# Patient Record
Sex: FEMALE | Race: WHITE | Employment: UNEMPLOYED
[De-identification: names, ages, dates, MRNs, and addresses within clinical notes are randomized per-mention and may not be internally consistent; named-entity substitution may affect disease eponyms.]

---

## 2023-05-08 ENCOUNTER — HOSPITAL ENCOUNTER (EMERGENCY)
Facility: HOSPITAL | Age: 63
Discharge: HOME | End: 2023-05-09
Attending: EMERGENCY MEDICINE
Payer: COMMERCIAL

## 2023-05-08 ENCOUNTER — APPOINTMENT (OUTPATIENT)
Facility: HOSPITAL | Age: 63
End: 2023-05-08
Payer: COMMERCIAL

## 2023-05-08 DIAGNOSIS — S93.05XA DISLOCATION, ANKLE, LEFT, INITIAL ENCOUNTER: ICD-10-CM

## 2023-05-08 DIAGNOSIS — S82.402A CLOSED FRACTURE OF LEFT TIBIA AND FIBULA, INITIAL ENCOUNTER: Primary | ICD-10-CM

## 2023-05-08 DIAGNOSIS — S82.202A CLOSED FRACTURE OF LEFT TIBIA AND FIBULA, INITIAL ENCOUNTER: Primary | ICD-10-CM

## 2023-05-08 DIAGNOSIS — S82.55XA CLOSED NONDISPLACED FRACTURE OF MEDIAL MALLEOLUS OF LEFT TIBIA, INITIAL ENCOUNTER: ICD-10-CM

## 2023-05-08 PROCEDURE — 99284 EMERGENCY DEPT VISIT MOD MDM: CPT

## 2023-05-08 PROCEDURE — 2500000003 HC RX 250 WO HCPCS: Performed by: NURSE PRACTITIONER

## 2023-05-08 PROCEDURE — 73600 X-RAY EXAM OF ANKLE: CPT

## 2023-05-08 PROCEDURE — 73610 X-RAY EXAM OF ANKLE: CPT

## 2023-05-08 PROCEDURE — 27810 TREATMENT OF ANKLE FRACTURE: CPT

## 2023-05-08 PROCEDURE — 96374 THER/PROPH/DIAG INJ IV PUSH: CPT

## 2023-05-08 PROCEDURE — 6370000000 HC RX 637 (ALT 250 FOR IP): Performed by: NURSE PRACTITIONER

## 2023-05-08 RX ORDER — OXYCODONE HYDROCHLORIDE AND ACETAMINOPHEN 5; 325 MG/1; MG/1
1 TABLET ORAL
Status: COMPLETED | OUTPATIENT
Start: 2023-05-08 | End: 2023-05-08

## 2023-05-08 RX ORDER — HYDROMORPHONE HYDROCHLORIDE 1 MG/ML
1 INJECTION, SOLUTION INTRAMUSCULAR; INTRAVENOUS; SUBCUTANEOUS
Status: COMPLETED | OUTPATIENT
Start: 2023-05-08 | End: 2023-05-08

## 2023-05-08 RX ADMIN — HYDROMORPHONE HYDROCHLORIDE 1 MG: 1 INJECTION, SOLUTION INTRAMUSCULAR; INTRAVENOUS; SUBCUTANEOUS at 23:27

## 2023-05-08 RX ADMIN — OXYCODONE AND ACETAMINOPHEN 1 TABLET: 5; 325 TABLET ORAL at 21:48

## 2023-05-08 ASSESSMENT — PAIN SCALES - GENERAL
PAINLEVEL_OUTOF10: 10
PAINLEVEL_OUTOF10: 5

## 2023-05-08 ASSESSMENT — PAIN DESCRIPTION - ORIENTATION: ORIENTATION: LEFT

## 2023-05-08 ASSESSMENT — PAIN DESCRIPTION - LOCATION: LOCATION: ANKLE

## 2023-05-09 VITALS
RESPIRATION RATE: 24 BRPM | WEIGHT: 145 LBS | TEMPERATURE: 98.5 F | DIASTOLIC BLOOD PRESSURE: 70 MMHG | HEART RATE: 97 BPM | HEIGHT: 65 IN | SYSTOLIC BLOOD PRESSURE: 133 MMHG | BODY MASS INDEX: 24.16 KG/M2 | OXYGEN SATURATION: 97 %

## 2023-05-09 RX ORDER — IBUPROFEN 600 MG/1
600 TABLET ORAL 3 TIMES DAILY PRN
Qty: 30 TABLET | Refills: 0 | Status: SHIPPED | OUTPATIENT
Start: 2023-05-09 | End: 2023-05-19

## 2023-05-09 RX ORDER — OXYCODONE HYDROCHLORIDE AND ACETAMINOPHEN 5; 325 MG/1; MG/1
1 TABLET ORAL EVERY 6 HOURS PRN
Qty: 15 TABLET | Refills: 0 | Status: SHIPPED | OUTPATIENT
Start: 2023-05-09 | End: 2023-05-13

## 2023-05-09 NOTE — PROCEDURES
ER Attending Note attestation for Procedures and/or critical care    Ortho Injury    Date/Time: 5/9/2023 12:23 AM  Performed by: Brielle Aquino DO  Authorized by: Brielle Aquino DO   Consent: Written consent obtained. Risks and benefits: risks, benefits and alternatives were discussed  Consent given by: patient  Patient understanding: patient states understanding of the procedure being performed  Patient consent: the patient's understanding of the procedure matches consent given  Procedure consent: procedure consent matches procedure scheduled  Relevant documents: relevant documents present and verified  Test results: test results available and properly labeled  Site marked: the operative site was marked  Imaging studies: imaging studies available  Patient identity confirmed: verbally with patient, arm band and hospital-assigned identification number  Time out: Immediately prior to procedure a \"time out\" was called to verify the correct patient, procedure, equipment, support staff and site/side marked as required.   Injury location: ankle  Location details: left ankle  Injury type: fracture-dislocation  Fracture type: bimalleolar  Pre-procedure neurovascular assessment: neurovascularly intact  Pre-procedure distal perfusion: normal  Pre-procedure neurological function: normal  Pre-procedure range of motion: reduced    Anesthesia:  Local anesthesia used: no    Sedation:  Patient sedated: no    Manipulation performed: yes  Skin traction used: yes  Skeletal traction used: yes  Reduction successful: yes  X-ray confirmed reduction: yes  Immobilization: splint  Splint type: short leg (Short leg with ankle stirrup splint)  Supplies used: Ortho-Glass  Post-procedure neurovascular assessment: post-procedure neurovascularly intact  Post-procedure distal perfusion: normal  Post-procedure neurological function: normal  Post-procedure range of motion: improved  Patient tolerance: patient tolerated the procedure well with no

## 2023-05-09 NOTE — ED TRIAGE NOTES
Pt arrived with c.o L ankle injury, pt reports mechanical fall, missed one step and twist her ankle. Pt has significant swelling to ankle and possibly deformity noted by RN. Denies LOC, no blood thinners, no other pain besides ankle.

## 2023-05-09 NOTE — ED PROVIDER NOTES
[facility] EMERGENCY DEPT  EMERGENCY DEPARTMENT ENCOUNTER       Pt Name: Arline Fisher  MRN: [id number]  [place] 1960  Date of evaluation: 5/8/2023  PCP: None None  Note Started: 4:23 AM 5/8/23     CHIEF COMPLAINT       Chief Complaint   Patient presents with    Ankle Pain        HISTORY OF PRESENT ILLNESS: 1 or more elements      History From: Patient and Patient's   HPI Limitations: None  Chronic Conditions: Hypertension, hyperlipidemia  Social Determinants affecting Dx or Tx: None     Arline Fisher is a 63 y.o. female who presents to ED c/o left ankle pain s/p nonsyncopal fall. Patient reports that she missed a step while going up stairs and twisted her left ankle. Patient denies dizziness, chest pain, shortness of breath prior to fall. Patient denies injuring head or neck, denies headache, neck pain, LOC, nausea/vomiting. Patient denies paresthesias, arrives with ice to left ankle. Nursing Notes were all reviewed and agreed with or any disagreements were addressed in the HPI. PAST HISTORY     Past Medical History:  No past medical history on file. Past Surgical History:  No past surgical history on file. Family History:  No family history on file. Social History:  Social History     Socioeconomic History    Marital status:        Allergies:  No Known Allergies    CURRENT MEDICATIONS      No current facility-administered medications for this encounter. Current Outpatient Medications   Medication Sig Dispense Refill    oxyCODONE-acetaminophen (PERCOCET) 5-325 MG per tablet Take 1 tablet by mouth every 6 hours as needed for Pain for up to 4 days. Intended supply: 3 days.  Take lowest dose possible to manage pain Max Daily Amount: 4 tablets 15 tablet 0    ibuprofen (ADVIL;MOTRIN) 600 MG tablet Take 1 tablet by mouth 3 times daily as needed for Pain 30 tablet 0          PHYSICAL EXAM      Vitals:    05/08/23 2330 05/09/23 0000 05/09/23 0020 05/09/23 0030   BP: (!) 152/92 (!)

## 2023-05-09 NOTE — PROGRESS NOTES
[facility] TRANSFER OF CARE         Patient care Handed off from [facility] to Apolonia Garcia DO   @ 12:22 AM     Discussed the patient's complaint, presentation, vitals and differential diagnosis. Discussed the patient's current status, and response to treatments  Reviewed critical lab values, allergies, and other factors. Rounded at the patient's bedside, the patient and family's questions and concerns were addressed. Issues or problems that are still being evaluated are: Waiting for repeat x-ray result to confirm reduction. No results found for this or any previous visit (from the past 24 hour(s)). XR ANKLE LEFT (2 VIEWS)   Final Result   Improved alignment of acute distal fibula and tibia fractures. XR ANKLE LEFT (MIN 3 VIEWS)   Final Result   Acute fractures of the distal fibula, posterior distal tibia, and   medial malleolus. Tibiotalar dislocation. ED Course as of 05/09/23 0022   Mon May 08, 2023   2222 X-Ray L ankle showing acute fractures of the distal fibula, posterior distal tibia, and  medial malleolus. Tibiotalar dislocation. Placing ortho consult at this time. [TC]   2234 Case with orthopedic surgeon (Dr. Tacos Johnson) who recommends reduction in ED with posterior splint, crutches and follow-up with Dr. Elena Johnson for foot and ankle specialist. Guillermo Steele   2234 Silas case with attending MD (Dr. Shira Washington) who will evaluate patient at this time. [TC]   2250 States she lives in [facility] and is supposed to travel home tomorrow. Patient reports she has an orthopedist in [place] and would prefer to follow-up at home. [TC]   2254 Discussed with patient plan for close reduction. She states she will like to avoid sedation. We will do close reduction. Discussed risks and benefits of closed reduction patient agreeable with the plan as outlined. [LILLIAN]   8957 Care transferred to attending MD (Dr. Shira Washington) at this time. [TC]   Tue May 09, 2023   0018 History shows improved alignment.

## 2023-05-09 NOTE — DISCHARGE INSTRUCTIONS
Follow-up with your orthopedic doctor tomorrow. We gave you the name of a local orthopedic/podiatrist.  Take aspirin starting tomorrow before the flight, and then for the next 3 to 4 days. If you develop unexpected pain or swelling in the ankle worse than currently then you should go to a nearest medical facility to be evaluated for DVT. If you notice discoloration of the toes including purplish discoloration, worsening or severe pain, or any worsening in your condition in general please return or go to your nearest emergency room.   I prescribed you some pain medicine to take as needed for pain as it takes expected to hurt for a few days at least.  Follow-up with your orthopedic surgeon as soon as possible

## 2023-05-09 NOTE — ED NOTES
Left foot toes pink and warm, cap refill less than 3 sec, pulse present     Meg Alvarado RN  05/09/23 9145